# Patient Record
Sex: FEMALE | Race: WHITE | NOT HISPANIC OR LATINO
[De-identification: names, ages, dates, MRNs, and addresses within clinical notes are randomized per-mention and may not be internally consistent; named-entity substitution may affect disease eponyms.]

---

## 2017-03-18 ENCOUNTER — APPOINTMENT (OUTPATIENT)
Dept: OBGYN | Facility: CLINIC | Age: 57
End: 2017-03-18

## 2017-03-18 VITALS
DIASTOLIC BLOOD PRESSURE: 70 MMHG | SYSTOLIC BLOOD PRESSURE: 110 MMHG | WEIGHT: 119 LBS | HEIGHT: 62.5 IN | BODY MASS INDEX: 21.35 KG/M2

## 2017-03-18 DIAGNOSIS — Z80.1 FAMILY HISTORY OF MALIGNANT NEOPLASM OF TRACHEA, BRONCHUS AND LUNG: ICD-10-CM

## 2017-03-20 LAB — HPV HIGH+LOW RISK DNA PNL CVX: NEGATIVE

## 2017-03-22 LAB — CYTOLOGY CVX/VAG DOC THIN PREP: NORMAL

## 2017-05-22 ENCOUNTER — OTHER (OUTPATIENT)
Age: 57
End: 2017-05-22

## 2018-03-29 ENCOUNTER — APPOINTMENT (OUTPATIENT)
Dept: OBGYN | Facility: CLINIC | Age: 58
End: 2018-03-29
Payer: COMMERCIAL

## 2018-03-29 VITALS
SYSTOLIC BLOOD PRESSURE: 110 MMHG | HEIGHT: 65.2 IN | DIASTOLIC BLOOD PRESSURE: 60 MMHG | BODY MASS INDEX: 19.59 KG/M2 | WEIGHT: 119 LBS

## 2018-03-29 PROCEDURE — 99396 PREV VISIT EST AGE 40-64: CPT

## 2019-05-24 ENCOUNTER — APPOINTMENT (OUTPATIENT)
Dept: OBGYN | Facility: CLINIC | Age: 59
End: 2019-05-24
Payer: COMMERCIAL

## 2019-05-24 DIAGNOSIS — Z92.89 PERSONAL HISTORY OF OTHER MEDICAL TREATMENT: ICD-10-CM

## 2019-05-24 DIAGNOSIS — Z98.890 OTHER SPECIFIED POSTPROCEDURAL STATES: ICD-10-CM

## 2019-05-24 PROCEDURE — 99396 PREV VISIT EST AGE 40-64: CPT

## 2019-05-24 RX ORDER — VALSARTAN 80 MG/1
80 TABLET, COATED ORAL
Qty: 30 | Refills: 0 | Status: COMPLETED | COMMUNITY
Start: 2017-12-08 | End: 2019-05-24

## 2019-05-24 RX ORDER — ESTRADIOL 10 UG/1
10 TABLET, FILM COATED VAGINAL
Qty: 20 | Refills: 6 | Status: COMPLETED | COMMUNITY
Start: 2018-03-29 | End: 2019-05-24

## 2019-05-24 NOTE — HISTORY OF PRESENT ILLNESS
[1 Year Ago] : 1 year ago [Good] : being in good health [Regular Exercise] : regular exercise [Healthy Diet] : a healthy diet [Weight Concerns] : no concerns with her weight [Menstrual Problems] : reports normal menses [Up to Date] : not up to date with ~his/her~ STD screening

## 2019-05-24 NOTE — PHYSICAL EXAM
[Awake] : awake [Alert] : alert [Acute Distress] : no acute distress [Thyroid Nodule] : no thyroid nodule [LAD] : no lymphadenopathy [Goiter] : no goiter [Mass] : no breast mass [Nipple Discharge] : no nipple discharge [Soft] : soft [Axillary LAD] : no axillary lymphadenopathy [Tender] : non tender [Distended] : not distended [H/Smegaly] : no hepatosplenomegaly [Oriented x3] : oriented to person, place, and time [Depressed Mood] : not depressed [Flat Affect] : affect not flat [Labia Majora] : labia major [Labia Minora] : labia minora [No Bleeding] : there was no active vaginal bleeding [Normal] : clitoris [Pap Obtained] : a Pap smear was performed [Uterine Adnexae] : were not tender and not enlarged [FreeTextEntry9] : getting colonoscopy refferal given

## 2019-05-30 LAB — HPV HIGH+LOW RISK DNA PNL CVX: NOT DETECTED

## 2019-06-03 LAB — CYTOLOGY CVX/VAG DOC THIN PREP: NORMAL

## 2020-05-26 ENCOUNTER — APPOINTMENT (OUTPATIENT)
Dept: OBGYN | Facility: CLINIC | Age: 60
End: 2020-05-26
Payer: COMMERCIAL

## 2020-05-26 VITALS
DIASTOLIC BLOOD PRESSURE: 68 MMHG | WEIGHT: 115 LBS | BODY MASS INDEX: 20.38 KG/M2 | SYSTOLIC BLOOD PRESSURE: 130 MMHG | TEMPERATURE: 97.8 F | HEIGHT: 63 IN

## 2020-05-26 PROCEDURE — 82270 OCCULT BLOOD FECES: CPT

## 2020-05-26 PROCEDURE — 99396 PREV VISIT EST AGE 40-64: CPT

## 2020-05-26 NOTE — PHYSICAL EXAM
[Awake] : awake [LAD] : no lymphadenopathy [Alert] : alert [Acute Distress] : no acute distress [Goiter] : no goiter [Thyroid Nodule] : no thyroid nodule [Nipple Discharge] : no nipple discharge [Mass] : no breast mass [Axillary LAD] : no axillary lymphadenopathy [Soft] : soft [Tender] : non tender [Distended] : not distended [H/Smegaly] : no hepatosplenomegaly [Oriented x3] : oriented to person, place, and time [Depressed Mood] : not depressed [Flat Affect] : affect not flat [Labia Majora] : labia major [Labia Minora] : labia minora [Normal] : clitoris [No Bleeding] : there was no active vaginal bleeding [No Tenderness] : no rectal tenderness [Uterine Adnexae] : were not tender and not enlarged [Occult Blood] : occult blood test from digital rectal exam was negative [Nl Sphincter Tone] : normal sphincter tone

## 2020-05-26 NOTE — HISTORY OF PRESENT ILLNESS
[Good] : being in good health [1 Year Ago] : 1 year ago [Postmenopausal] : is postmenopausal [Regular Exercise] : regular exercise [Healthy Diet] : a healthy diet

## 2021-07-03 ENCOUNTER — EMERGENCY (EMERGENCY)
Facility: HOSPITAL | Age: 61
LOS: 0 days | Discharge: HOME | End: 2021-07-03
Attending: EMERGENCY MEDICINE
Payer: COMMERCIAL

## 2021-07-03 VITALS
OXYGEN SATURATION: 100 % | SYSTOLIC BLOOD PRESSURE: 161 MMHG | WEIGHT: 115.08 LBS | DIASTOLIC BLOOD PRESSURE: 96 MMHG | HEART RATE: 66 BPM | TEMPERATURE: 97 F | RESPIRATION RATE: 18 BRPM

## 2021-07-03 VITALS
DIASTOLIC BLOOD PRESSURE: 73 MMHG | TEMPERATURE: 97 F | SYSTOLIC BLOOD PRESSURE: 171 MMHG | RESPIRATION RATE: 18 BRPM | HEART RATE: 79 BPM | OXYGEN SATURATION: 100 %

## 2021-07-03 DIAGNOSIS — M25.561 PAIN IN RIGHT KNEE: ICD-10-CM
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
## 2021-07-03 DIAGNOSIS — S80.01XA CONTUSION OF RIGHT KNEE, INITIAL ENCOUNTER: ICD-10-CM

## 2021-07-03 PROCEDURE — 93971 EXTREMITY STUDY: CPT | Mod: 26,RT

## 2021-07-03 PROCEDURE — 73562 X-RAY EXAM OF KNEE 3: CPT | Mod: 26,RT

## 2021-07-03 PROCEDURE — 99284 EMERGENCY DEPT VISIT MOD MDM: CPT

## 2021-07-03 PROCEDURE — 73562 X-RAY EXAM OF KNEE 3: CPT | Mod: RT

## 2021-07-03 PROCEDURE — 99284 EMERGENCY DEPT VISIT MOD MDM: CPT | Mod: 25

## 2021-07-03 PROCEDURE — 93971 EXTREMITY STUDY: CPT | Mod: RT

## 2021-07-03 NOTE — ED STATDOCS - NSFOLLOWUPINSTRUCTIONS_ED_ALL_ED_FT
Knee Pain    WHAT YOU NEED TO KNOW:    Knee pain may start suddenly, or it may be a long-term problem. You may have pain on the side, front, or back of your knee. You may have knee stiffness and swelling. You may hear popping sounds or feel like your knee is giving way or locking up as you walk. You may feel pain when you sit, stand, walk, or climb up and down stairs. Knee pain can be caused by conditions such as obesity, inflammation, or strains or tears in ligaments or tendons.     DISCHARGE INSTRUCTIONS:    Return to the emergency department if:   •Your pain is worse, even after treatment.       •You cannot bend or straighten your leg completely.       •The swelling around your knee does not go down even with treatment.      •Your knee is painful and hot to the touch.       Contact your healthcare provider if:   •You have questions or concerns about your condition or care.           Medicines: You may need any of the following:   •NSAIDs help decrease swelling and pain or fever. This medicine is available with or without a doctor's order. NSAIDs can cause stomach bleeding or kidney problems in certain people. If you take blood thinner medicine, always ask your healthcare provider if NSAIDs are safe for you. Always read the medicine label and follow directions.      •Acetaminophen decreases pain and fever. It is available without a doctor's order. Ask how much to take and how often to take it. Follow directions. Read the labels of all other medicines you are using to see if they also contain acetaminophen, or ask your doctor or pharmacist. Acetaminophen can cause liver damage if not taken correctly. Do not use more than 4 grams (4,000 milligrams) total of acetaminophen in one day.       •Prescription pain medicine may be given. Ask your healthcare provider how to take this medicine safely. Some prescription pain medicines contain acetaminophen. Do not take other medicines that contain acetaminophen without talking to your healthcare provider. Too much acetaminophen may cause liver damage. Prescription pain medicine may cause constipation. Ask your healthcare provider how to prevent or treat constipation.       •Take your medicine as directed. Contact your healthcare provider if you think your medicine is not helping or if you have side effects. Tell him or her if you are allergic to any medicine. Keep a list of the medicines, vitamins, and herbs you take. Include the amounts, and when and why you take them. Bring the list or the pill bottles to follow-up visits. Carry your medicine list with you in case of an emergency.      What you can do to manage your symptoms:   •Rest your knee so it can heal. Limit activities that increase your pain. Do low-impact exercises, such as walking or swimming.       •Apply ice to help reduce swelling and pain. Use an ice pack, or put crushed ice in a plastic bag. Cover it with a towel before you apply it to your knee. Apply ice for 15 to 20 minutes every hour, or as directed.      •Apply compression to help reduce swelling. Use a brace or bandage only as directed.      •Elevate your knee to help decrease pain and swelling. Elevate your knee while you are sitting or lying down. Prop your leg on pillows to keep your knee above the level of your heart.      •Prevent your knee from moving as directed. Your healthcare provider may put on a cast or splint. You may need to wear a leg brace to stabilize your knee. A leg brace can be adjusted to increase your range of motion as your knee heals.  Hinged Knee Braces            What you can do to prevent knee pain:   •Maintain a healthy weight. Extra weight increases your risk for knee pain. Ask your healthcare provider how much you should weigh. He or she can help you create a safe weight loss plan if you need to lose weight.      •Exercise or train properly. Use the correct equipment for sports. Wear shoes that provide good support. Check your posture often as you exercise, play sports, or train for an event. This can help prevent stress and strain on your knees. Rest between sessions so you do not overwork your knees.      Follow up with your healthcare provider within 24 hours or as directed: You may need follow-up treatments, such as steroid injections to decrease pain. Write down your questions so you remember to ask them during your visits.        © Copyright [employer] 2021           back to top                          © Copyright [employer] 2021

## 2021-07-03 NOTE — ED ADULT NURSE REASSESSMENT NOTE - NS ED NURSE REASSESS COMMENT FT1
Bp mildly elevated at d/c, pt asymptomatic. Dr. Kebede aware and gave ok for PT d/c. PT left ED safe calm and comfortable without distress.

## 2021-07-03 NOTE — ED STATDOCS - MDM PATIENT STATEMENT FOR ADDL TREATMENT
Called patient to schedule a screening mammogram PATIENTPHONEMESSAGE: left message.-        Patient with one or more new problems requiring additional work-up/treatment.

## 2021-07-03 NOTE — ED STATDOCS - PHYSICAL EXAMINATION
negative anterior drawer, negative airam, negative lachman, some ecchymosis at the popliteal fossa     pt w/ knee pain possible ligamentous injury vs ruptured bakers cyst. will XR r/o fx, US, possible cyst. Pt declined pain medications at this time.

## 2021-07-03 NOTE — ED STATDOCS - OBJECTIVE STATEMENT
62 y/o F no significant PMHx presents to the ED c/o R knee pain. Pt reports she was standing on a chair last night and felt a "pop" and felt sudden pain, this morning woke up w/ bruising and swelling to her posterior aspect of her knee. Pt took a dose of advil at 9am today. Pt saw and orthopedist on Thursday, XR showed that her patella "was floating on my femur," attended 1 session of PT. Pt states movement exacerbates the pain. No other complaints at this time.

## 2021-07-03 NOTE — ED STATDOCS - PROGRESS NOTE DETAILS
Pt. is a 61 year old female presenting with right knee pain.  Pt. was standing on ac hair last night and felt a pop to knee with pain.  Neg. fall.  Pt. with bruising and swelling to knee.  Advil taken PTA.  Pt. saw orthopedist Thursday for knee pain.  Pt. had one PT session. Knee xray mild OA medial compartment.  Holly Solomon PA-C Pt. is a 61 year old female presenting with right knee pain.  Pt. was standing on ac hair last night and felt a pop to knee with pain.  Neg. fall.  Pt. with bruising and swelling to knee.  Advil taken PTA.  Pt. saw orthopedist Thursday for knee pain.  Pt. had one PT session.   Pt. to have xray, US as pain is behind knee.  Holly Solomon PA-C

## 2021-07-03 NOTE — ED ADULT TRIAGE NOTE - CHIEF COMPLAINT QUOTE
pt presents to ED due to complaints of right knee pain pt states she was standing on a chair last night and felt a "pop" last dose of advil 9am

## 2021-07-03 NOTE — ED STATDOCS - CLINICAL SUMMARY MEDICAL DECISION MAKING FREE TEXT BOX
pt w/ knee pain possible ligamentous injury vs ruptured bakers cyst. will XR r/o fx, US, possible cyst. Pt declined pain medications at this time.

## 2021-07-03 NOTE — ED ADULT NURSE NOTE - OBJECTIVE STATEMENT
PT to ED from home for reports of right knee pain. PT reports feeling a pop in the knee and woke up the next days with bruising to the leg. Denies numbness and tingling. PT ambulatory without difficulty. No other complaints noted at this time.

## 2021-07-22 ENCOUNTER — APPOINTMENT (OUTPATIENT)
Dept: OBGYN | Facility: CLINIC | Age: 61
End: 2021-07-22
Payer: COMMERCIAL

## 2021-07-22 VITALS
BODY MASS INDEX: 20.55 KG/M2 | SYSTOLIC BLOOD PRESSURE: 120 MMHG | TEMPERATURE: 97 F | DIASTOLIC BLOOD PRESSURE: 70 MMHG | HEIGHT: 63 IN | WEIGHT: 116 LBS

## 2021-07-22 DIAGNOSIS — Z12.11 ENCOUNTER FOR SCREENING FOR MALIGNANT NEOPLASM OF COLON: ICD-10-CM

## 2021-07-22 PROCEDURE — 99072 ADDL SUPL MATRL&STAF TM PHE: CPT

## 2021-07-22 PROCEDURE — 82270 OCCULT BLOOD FECES: CPT

## 2021-07-22 PROCEDURE — 99396 PREV VISIT EST AGE 40-64: CPT

## 2021-07-22 NOTE — HISTORY OF PRESENT ILLNESS
[FreeTextEntry1] : 62 yo doing well no pmpbleeding, no flashes, some vag dryness. exercises has no complaints needs mammo, dexa normal in 2017, having cononoscopy next yr [Currently Active] : currently active [Men] : men [Vaginal] : vaginal [No] : No

## 2021-07-22 NOTE — PHYSICAL EXAM

## 2021-07-26 LAB — HPV HIGH+LOW RISK DNA PNL CVX: NOT DETECTED

## 2022-07-15 PROBLEM — Z78.9 OTHER SPECIFIED HEALTH STATUS: Chronic | Status: ACTIVE | Noted: 2021-07-03

## 2022-08-01 ENCOUNTER — APPOINTMENT (OUTPATIENT)
Dept: OBGYN | Facility: CLINIC | Age: 62
End: 2022-08-01

## 2022-08-22 ENCOUNTER — APPOINTMENT (OUTPATIENT)
Dept: OBGYN | Facility: CLINIC | Age: 62
End: 2022-08-22

## 2022-08-22 VITALS
WEIGHT: 113 LBS | SYSTOLIC BLOOD PRESSURE: 124 MMHG | DIASTOLIC BLOOD PRESSURE: 70 MMHG | TEMPERATURE: 98 F | BODY MASS INDEX: 20.02 KG/M2

## 2022-08-22 PROCEDURE — 99396 PREV VISIT EST AGE 40-64: CPT

## 2022-08-22 NOTE — DISCUSSION/SUMMARY
[FreeTextEntry1] : Discussed with the pt the importance of exercise weight bearing,yoga, aerobics good variety, \par calcium totalling  mg between food and vitamins also vitamin D 2000iu daily, fish oil. \par ALso that any drop of blood after menopause is not good. Encouraged SBE\par FU in one year. \par  Discussed vaginal lubricants OTC like luvena moisturizers,ky ovule and relplense . There are also vaginal estrogens, and coconut oil with intercourse.\par discussed  derma appt.\par

## 2022-08-22 NOTE — HISTORY OF PRESENT ILLNESS
[FreeTextEntry1] : 61 yo here for av, \par NO  bleeding some vag dryness\par still gets the HSV, [Patient reported bone density results were normal] : Patient reported bone density results were normal [Mammogramdate] : 2021 [PapSmeardate] : 2021 [TextBox_31] : atrophic [BoneDensityDate] : 2017  [ColonoscopyDate] : 1/22 [TextBox_43] : 2 polyps [Currently Active] : currently active [Men] : men [Vaginal] : vaginal [No] : No

## 2023-08-14 ENCOUNTER — RX RENEWAL (OUTPATIENT)
Age: 63
End: 2023-08-14

## 2023-08-14 RX ORDER — VALACYCLOVIR 500 MG/1
500 TABLET, FILM COATED ORAL
Qty: 90 | Refills: 0 | Status: ACTIVE | COMMUNITY
Start: 2018-02-06 | End: 1900-01-01

## 2023-09-07 ENCOUNTER — APPOINTMENT (OUTPATIENT)
Dept: OBGYN | Facility: CLINIC | Age: 63
End: 2023-09-07
Payer: COMMERCIAL

## 2023-09-07 VITALS — BODY MASS INDEX: 19.67 KG/M2 | WEIGHT: 111 LBS | HEIGHT: 63 IN

## 2023-09-07 DIAGNOSIS — Z12.31 ENCOUNTER FOR SCREENING MAMMOGRAM FOR MALIGNANT NEOPLASM OF BREAST: ICD-10-CM

## 2023-09-07 DIAGNOSIS — Z01.419 ENCOUNTER FOR GYNECOLOGICAL EXAMINATION (GENERAL) (ROUTINE) W/OUT ABNORMAL FINDINGS: ICD-10-CM

## 2023-09-07 DIAGNOSIS — B00.9 HERPESVIRAL INFECTION, UNSPECIFIED: ICD-10-CM

## 2023-09-07 DIAGNOSIS — Z13.820 ENCOUNTER FOR SCREENING FOR OSTEOPOROSIS: ICD-10-CM

## 2023-09-07 PROCEDURE — 99396 PREV VISIT EST AGE 40-64: CPT

## 2023-09-07 RX ORDER — VALACYCLOVIR 500 MG/1
500 TABLET, FILM COATED ORAL DAILY
Qty: 90 | Refills: 3 | Status: ACTIVE | COMMUNITY
Start: 2018-03-29 | End: 1900-01-01

## 2023-09-07 NOTE — DISCUSSION/SUMMARY
[FreeTextEntry1] : Discussed with the pt the importance of exercise weight bearing,yoga, aerobics good variety,  calcium totalling  mg between food and vitamins also vitamin D 2000iu daily, fish oil.  ALso that any drop of blood after menopause is not good. Encouraged SBE FU in one year.   Discussed vaginal lubricants OTC like luvena moisturizers,ky ovule and relplense . There are also vaginal estrogens, and coconut oil with intercourse. discussed colonoscopy and derma appt.

## 2023-09-07 NOTE — HISTORY OF PRESENT ILLNESS
[FreeTextEntry1] : 64 yo here for av no  bleeding ,no hot flashes, no incontinence, [Currently Active] : currently active [Men] : men [Vaginal] : vaginal [No] : No

## 2023-09-11 ENCOUNTER — NON-APPOINTMENT (OUTPATIENT)
Age: 63
End: 2023-09-11

## 2023-09-14 LAB
CYTOLOGY CVX/VAG DOC THIN PREP: ABNORMAL
HPV HIGH+LOW RISK DNA PNL CVX: NOT DETECTED

## 2024-09-19 ENCOUNTER — APPOINTMENT (OUTPATIENT)
Dept: OBGYN | Facility: CLINIC | Age: 64
End: 2024-09-19
Payer: COMMERCIAL

## 2024-09-19 VITALS
HEIGHT: 63 IN | SYSTOLIC BLOOD PRESSURE: 120 MMHG | WEIGHT: 119 LBS | RESPIRATION RATE: 18 BRPM | BODY MASS INDEX: 21.09 KG/M2 | HEART RATE: 68 BPM | DIASTOLIC BLOOD PRESSURE: 68 MMHG

## 2024-09-19 DIAGNOSIS — B00.9 HERPESVIRAL INFECTION, UNSPECIFIED: ICD-10-CM

## 2024-09-19 DIAGNOSIS — Z13.820 ENCOUNTER FOR SCREENING FOR OSTEOPOROSIS: ICD-10-CM

## 2024-09-19 DIAGNOSIS — Z12.11 ENCOUNTER FOR SCREENING FOR MALIGNANT NEOPLASM OF COLON: ICD-10-CM

## 2024-09-19 DIAGNOSIS — Z12.31 ENCOUNTER FOR SCREENING MAMMOGRAM FOR MALIGNANT NEOPLASM OF BREAST: ICD-10-CM

## 2024-09-19 DIAGNOSIS — Z01.419 ENCOUNTER FOR GYNECOLOGICAL EXAMINATION (GENERAL) (ROUTINE) W/OUT ABNORMAL FINDINGS: ICD-10-CM

## 2024-09-19 LAB
CARD LOT #: NORMAL
CARD LOT EXP DATE: NORMAL
DATE COLLECTED: NORMAL
DATE COLLECTED: NORMAL
DEVELOPER LOT #: NORMAL
DEVELOPER LOT EXP DATE: NORMAL
HEMOCCULT 2: NEGATIVE
HEMOCCULT SP1 STL QL: NEGATIVE
QUALITY CONTROL: YES
QUALITY CONTROL: YES

## 2024-09-19 PROCEDURE — 99396 PREV VISIT EST AGE 40-64: CPT

## 2024-09-19 PROCEDURE — 82270 OCCULT BLOOD FECES: CPT

## 2024-09-19 NOTE — HISTORY OF PRESENT ILLNESS
[FreeTextEntry1] : 63 yo  menopausal   gets up 3 times at night to urinate, no  bleeding,, some vag dryness. [BoneDensityDate] : 2022 [TextBox_37] : osteopenia [ColonoscopyDate] : 1/22 2polyps [Currently Active] : currently active

## 2024-09-19 NOTE — PHYSICAL EXAM
[Chaperone Declined] : Patient declined chaperone [Appropriately responsive] : appropriately responsive [Alert] : alert [No Acute Distress] : no acute distress [No Lymphadenopathy] : no lymphadenopathy [Soft] : soft [Non-tender] : non-tender [Non-distended] : non-distended [No HSM] : No HSM [No Lesions] : no lesions [No Mass] : no mass [Oriented x3] : oriented x3 [Examination Of The Breasts] : a normal appearance [No Masses] : no breast masses were palpable [Labia Majora] : normal [Labia Minora] : normal [Normal] : normal [Uterine Adnexae] : normal [No Tenderness] : no tenderness [FreeTextEntry9] : -mass-guiac

## 2024-09-21 ENCOUNTER — NON-APPOINTMENT (OUTPATIENT)
Age: 64
End: 2024-09-21